# Patient Record
Sex: FEMALE | Race: OTHER | NOT HISPANIC OR LATINO | URBAN - METROPOLITAN AREA
[De-identification: names, ages, dates, MRNs, and addresses within clinical notes are randomized per-mention and may not be internally consistent; named-entity substitution may affect disease eponyms.]

---

## 2022-08-31 ENCOUNTER — EMERGENCY (EMERGENCY)
Facility: HOSPITAL | Age: 22
LOS: 1 days | Discharge: HOME | End: 2022-08-31
Admitting: EMERGENCY MEDICINE
Payer: COMMERCIAL

## 2022-08-31 VITALS
DIASTOLIC BLOOD PRESSURE: 68 MMHG | HEART RATE: 100 BPM | SYSTOLIC BLOOD PRESSURE: 123 MMHG | OXYGEN SATURATION: 100 % | TEMPERATURE: 98 F | RESPIRATION RATE: 18 BRPM

## 2022-08-31 PROCEDURE — 99284 EMERGENCY DEPT VISIT MOD MDM: CPT

## 2022-08-31 PROCEDURE — 72100 X-RAY EXAM L-S SPINE 2/3 VWS: CPT | Mod: 26

## 2022-08-31 PROCEDURE — 99053 MED SERV 10PM-8AM 24 HR FAC: CPT

## 2022-08-31 PROCEDURE — 73562 X-RAY EXAM OF KNEE 3: CPT | Mod: 26,LT

## 2022-08-31 PROCEDURE — 71101 X-RAY EXAM UNILAT RIBS/CHEST: CPT | Mod: 26,LT

## 2022-08-31 PROCEDURE — 73552 X-RAY EXAM OF FEMUR 2/>: CPT | Mod: 26,LT

## 2022-08-31 RX ORDER — LIDOCAINE 4 G/100G
1 CREAM TOPICAL
Qty: 5 | Refills: 0
Start: 2022-08-31

## 2022-08-31 RX ORDER — KETOROLAC TROMETHAMINE 30 MG/ML
30 SYRINGE (ML) INJECTION ONCE
Refills: 0 | Status: DISCONTINUED | OUTPATIENT
Start: 2022-08-31 | End: 2022-08-31

## 2022-08-31 RX ORDER — CYCLOBENZAPRINE HYDROCHLORIDE 10 MG/1
1 TABLET, FILM COATED ORAL
Qty: 14 | Refills: 0
Start: 2022-08-31

## 2022-08-31 RX ORDER — OXYCODONE AND ACETAMINOPHEN 5; 325 MG/1; MG/1
1 TABLET ORAL ONCE
Refills: 0 | Status: DISCONTINUED | OUTPATIENT
Start: 2022-08-31 | End: 2022-08-31

## 2022-08-31 RX ADMIN — OXYCODONE AND ACETAMINOPHEN 1 TABLET(S): 5; 325 TABLET ORAL at 03:00

## 2022-08-31 RX ADMIN — Medication 30 MILLIGRAM(S): at 03:00

## 2022-08-31 NOTE — ED PROVIDER NOTE - CARDIAC, MLM
+ tenderness to the left rib. no crepitus, bony deformities, paradoxical chest wall movement. Normal rate, regular rhythm.  Heart sounds S1, S2.  No murmurs, rubs or gallops.
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
## 2022-08-31 NOTE — ED POST DISCHARGE NOTE - RESULT SUMMARY
Xray Lumbar spine : Linear lucency in the region of the left L5 pars interarticuluris suspicious for possible pars defect. Consider cross sectional imaging with CT or MRI for further evaluation. Message left with Call Back  P.A. number and hours and main ED #  for return call back.
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
## 2022-08-31 NOTE — ED PROVIDER NOTE - MUSCULOSKELETAL [+], MLM
Pl inform the friend that maynor definitely needs a f/up,as she has not been seen since discharge and I can not be signing documents and nurses notes blindly, labs unsure what she needs.   left leg pain
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
## 2022-08-31 NOTE — ED PROVIDER NOTE - MUSCULOSKELETAL, MLM
tenderness to lateral aspect of proximal leg. no obvious bony deformities. patient able to ambulate and bear weight on leg. tenderness to the left lumbosacral region. no midline spinal tenderness. Spine appears normal, range of motion is not limited, no muscle or joint tenderness

## 2022-08-31 NOTE — ED PROVIDER NOTE - PATIENT PORTAL LINK FT
You can access the FollowMyHealth Patient Portal offered by [facility] by registering at the following website: [email or web address]. By joining [facility]’s FollowMyHealth portal, you will also be able to view your health information using other applications (apps) compatible with our system.
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
## 2023-10-31 NOTE — ED PROVIDER NOTE - WR ORDER NAME 2
To obtain blood tests   To continue Levothyroxine 125mcg po daily   Take levothyroxine on an empty stomach with water alone, 30-60 minutes before eating or taking other medications, 4 hours before any calcium or iron supplement  For follow up in 6 months       Xray Ribs 3 Views, Left